# Patient Record
Sex: MALE | Race: BLACK OR AFRICAN AMERICAN | NOT HISPANIC OR LATINO | Employment: FULL TIME | URBAN - METROPOLITAN AREA
[De-identification: names, ages, dates, MRNs, and addresses within clinical notes are randomized per-mention and may not be internally consistent; named-entity substitution may affect disease eponyms.]

---

## 2022-08-08 ENCOUNTER — OFFICE VISIT (OUTPATIENT)
Dept: URGENT CARE | Facility: CLINIC | Age: 27
End: 2022-08-08
Payer: COMMERCIAL

## 2022-08-08 VITALS — TEMPERATURE: 97.8 F | HEART RATE: 72 BPM | OXYGEN SATURATION: 97 % | RESPIRATION RATE: 14 BRPM

## 2022-08-08 DIAGNOSIS — H61.23 BILATERAL HEARING LOSS DUE TO CERUMEN IMPACTION: Primary | ICD-10-CM

## 2022-08-08 PROCEDURE — 99203 OFFICE O/P NEW LOW 30 MIN: CPT | Performed by: PHYSICIAN ASSISTANT

## 2022-08-08 PROCEDURE — 69209 REMOVE IMPACTED EAR WAX UNI: CPT | Performed by: PHYSICIAN ASSISTANT

## 2022-08-08 RX ORDER — OFLOXACIN 3 MG/ML
10 SOLUTION AURICULAR (OTIC) DAILY
Qty: 5 ML | Refills: 0 | Status: SHIPPED | OUTPATIENT
Start: 2022-08-08

## 2022-08-08 RX ORDER — OFLOXACIN 3 MG/ML
10 SOLUTION AURICULAR (OTIC) 2 TIMES DAILY
Qty: 5 ML | Refills: 0 | Status: SHIPPED | OUTPATIENT
Start: 2022-08-08 | End: 2022-08-08

## 2022-08-08 NOTE — PATIENT INSTRUCTIONS
Continue to monitor symptoms  If new or worsening symptoms develop, go immediately to Er  Drink plenty of fluids  Follow up with Family Doctor this week  Earwax Blockage   AMBULATORY CARE:   Earwax  can build up in your ear canal and cause a blockage  Earwax blockage happens when your ear makes earwax faster than your body can remove it  Common symptoms include the following:   Trouble hearing    Earache    Ear fullness or a feeling that something is plugging up your ear    Itching or ringing in your ear    Dizziness    Seed immediate care if:   You feel dizzy  You have discharge or blood coming out of your ear  Your ear pain does not go away or gets worse  Call your doctor if:   You have a fever  You have trouble hearing or hear ringing noises  You have questions or concerns about your condition or care  Treatment for earwax blockage:   Medicines  placed in the ear canal can soften the earwax so it will come out  Flushing your ear canal  with warm water may flush out the earwax  Small medical tools  may be used to remove the earwax  How to prevent earwax blockage:  Do not stick anything into your ears to clean them  Use cotton swabs on the outside of your ear only  Ask your healthcare provider for more information on ways to prevent blockage  Follow up with your doctor as directed:  Write down your questions so you remember to ask them during your visits  © Copyright [employer] 2022 Information is for End User's use only and may not be sold, redistributed or otherwise used for commercial purposes  All illustrations and images included in CareNotes® are the copyrighted property of A [employer] Inc  or [facility] Brett Awan   The above information is an  only  It is not intended as medical advice for individual conditions or treatments  Talk to your doctor, nurse or pharmacist before following any medical regimen to see if it is safe and effective for you

## 2022-08-08 NOTE — PROGRESS NOTES
[address]        NAME: Beny Rodarte is a 32 y o  male  : 1995    MRN: [id number]  DATE: 2022  TIME: 5:41 PM    Assessment and Plan   Bilateral hearing loss due to cerumen impaction [H61 23]  1  Bilateral hearing loss due to cerumen impaction  ofloxacin (FLOXIN) 0 3 % otic solution    Ear cerumen removal    DISCONTINUED: ofloxacin (FLOXIN) 0 3 % otic solution         Patient Instructions       Continue to monitor symptoms  If new or worsening symptoms develop, go immediately to Er  Drink plenty of fluids  Follow up with Family Doctor this week  Chief Complaint     Chief Complaint   Patient presents with    Ear Fullness     Pt presents with ear fullness bilateral, started two months ago; decreased hearing         History of Present Illness       Ear Fullness   There is pain in both ears  This is a recurrent problem  Episode onset: Worsening over the past month  The problem occurs constantly  The problem has been gradually worsening  There has been no fever  The patient is experiencing no pain  Associated symptoms include hearing loss (Muffled)  Pertinent negatives include no abdominal pain, coughing, diarrhea, ear discharge, headaches, neck pain, rash, rhinorrhea, sore throat or vomiting  Treatments tried: Debrox  Review of Systems   Review of Systems   Constitutional: Negative for chills, fatigue and fever  HENT: Positive for hearing loss (Muffled)  Negative for ear discharge, rhinorrhea and sore throat  Respiratory: Negative for cough, chest tightness, shortness of breath and wheezing  Cardiovascular: Negative for chest pain and palpitations  Gastrointestinal: Negative for abdominal pain, diarrhea and vomiting  Musculoskeletal: Negative for back pain, myalgias and neck pain  Skin: Negative for pallor and rash  Neurological: Negative for headaches           Current Medications       Current Outpatient Medications:     ofloxacin (FLOXIN) 0 3 % otic solution, Administer 10 drops into both ears daily, Disp: 5 mL, Rfl: 0    Current Allergies     Allergies as of 08/08/2022    (No Known Allergies)            The following portions of the patient's history were reviewed and updated as appropriate: allergies, current medications, past family history, past medical history, past social history, past surgical history and problem list      History reviewed  No pertinent past medical history  Past Surgical History:   Procedure Laterality Date    EYE SURGERY         History reviewed  No pertinent family history  Medications have been verified  Objective   Pulse 72   Temp 97 8 °F (36 6 °C)   Resp 14   SpO2 97%          Physical Exam     Physical Exam  Vitals and nursing note reviewed  Constitutional:       General: He is not in acute distress  Appearance: He is well-developed  He is not diaphoretic  HENT:      Head: Normocephalic and atraumatic  Right Ear: Tympanic membrane, ear canal and external ear normal  There is impacted cerumen  Left Ear: Tympanic membrane, ear canal and external ear normal  There is impacted cerumen  Ears:      Comments: TMs intact and pearly b/l S/p irrigation  Eyes:      General:         Right eye: No discharge  Left eye: No discharge  Conjunctiva/sclera: Conjunctivae normal    Cardiovascular:      Rate and Rhythm: Normal rate and regular rhythm  Heart sounds: Normal heart sounds  Pulmonary:      Effort: Pulmonary effort is normal  No respiratory distress  Breath sounds: Normal breath sounds  No wheezing or rales  Musculoskeletal:      Cervical back: Normal range of motion and neck supple  Lymphadenopathy:      Cervical: No cervical adenopathy  Skin:     General: Skin is warm  Findings: No rash         Ear cerumen removal    Date/Time: 8/8/2022 5:39 PM  Performed by: Rudolph Casillas PA-C  Authorized by: Rudolph Casillas PA-C   Universal Protocol:  Consent: Verbal consent obtained  Risks and benefits: risks, benefits and alternatives were discussed  Consent given by: patient  Time out: Immediately prior to procedure a "time out" was called to verify the correct patient, procedure, equipment, support staff and site/side marked as required  Timeout called at: 8/8/2022 5:30 PM   Patient understanding: patient states understanding of the procedure being performed  Patient consent: the patient's understanding of the procedure matches consent given  Procedure consent: procedure consent matches procedure scheduled  Relevant documents: relevant documents present and verified  Test results: test results available and properly labeled  Site marked: the operative site was marked  Radiology Images displayed and confirmed  If images not available, report reviewed: imaging studies available  Required items: required blood products, implants, devices, and special equipment available  Patient identity confirmed: verbally with patient      Patient location:  Clinic  Procedure details:     Local anesthetic:  None    Location:  R ear and L ear    Procedure type: irrigation only      Approach:  Natural orifice  Post-procedure details:     Complication:  None    Hearing quality:  Normal    Patient tolerance of procedure:   Tolerated well, no immediate complications

## 2024-01-10 ENCOUNTER — OFFICE VISIT (OUTPATIENT)
Dept: URGENT CARE | Facility: CLINIC | Age: 29
End: 2024-01-10
Payer: COMMERCIAL

## 2024-01-10 VITALS
WEIGHT: 228 LBS | SYSTOLIC BLOOD PRESSURE: 122 MMHG | DIASTOLIC BLOOD PRESSURE: 82 MMHG | TEMPERATURE: 98.6 F | OXYGEN SATURATION: 98 % | RESPIRATION RATE: 16 BRPM | HEART RATE: 106 BPM

## 2024-01-10 DIAGNOSIS — R05.9 COUGH, UNSPECIFIED TYPE: Primary | ICD-10-CM

## 2024-01-10 PROCEDURE — 87636 SARSCOV2 & INF A&B AMP PRB: CPT | Performed by: PHYSICIAN ASSISTANT

## 2024-01-10 PROCEDURE — 99213 OFFICE O/P EST LOW 20 MIN: CPT | Performed by: PHYSICIAN ASSISTANT

## 2024-01-10 RX ORDER — BENZONATATE 100 MG/1
100 CAPSULE ORAL 3 TIMES DAILY PRN
Qty: 20 CAPSULE | Refills: 0 | Status: SHIPPED | OUTPATIENT
Start: 2024-01-10

## 2024-01-10 NOTE — LETTER
January 10, 2024     Patient: Shelton Becerril  YOB: 1995  Date of Visit: 1/10/2024      To Whom it May Concern:    Shelton Becerril is under my professional care. Shelton was seen in my office on 1/10/2024. Shelton may return to work on 1/16/24 .    If you have any questions or concerns, please don't hesitate to call.         Sincerely,          Rebekah Regalado PA-C        CC: No Recipients

## 2024-01-10 NOTE — PROGRESS NOTES
Assessment/Plan    Cough, unspecified type [R05.9]  1. Cough, unspecified type  Covid19 and INFLUENZA A/B PCR    benzonatate (TESSALON PERLES) 100 mg capsule            Subjective:     Patient ID: Shelton Becerril is a 28 y.o. male.      Reason For Visit / Chief Complaint  Chief Complaint   Patient presents with    Cold Like Symptoms     Pt presents with productive cough, PND, body aches, nasal congestion, stuffy nose; started MOnday         D.R. is a 28-year-old male with no significant PMH, presenting to the urgent care today for flu-like symptoms x 3 days. Patient reports that he has been experiencing fatigue, low-grade fever, intermittent headache, ear fullness, post-nasal drip, congestion, cough, sore throat, and diarrhea since symptoms began. He states that he has been using Mucinex and Halls cough drops over-the-counter, which have provided some relief of symptoms. He admits to coughing up some clear and green sputum yesterday. Patient reports that his daughter has been sick recently with a cough, but denies any other sick contacts. Denies any chills, ear pain, chest pain, shortness of breath, dyspnea, nausea, and vomiting.      History reviewed. No pertinent past medical history.    Past Surgical History:   Procedure Laterality Date    EYE SURGERY         History reviewed. No pertinent family history.    Review of Systems   Constitutional:  Positive for fatigue and fever (Patient had a low-grade fever on Monday.). Negative for chills.   HENT:  Positive for congestion, postnasal drip, rhinorrhea and sore throat. Negative for ear pain (Patient admits to ear fullness and popping.).    Eyes:  Negative for visual disturbance.   Respiratory:  Positive for cough (Cough has been producing clear and green sputum.). Negative for chest tightness and shortness of breath.    Cardiovascular:  Negative for chest pain.   Gastrointestinal:  Positive for diarrhea. Negative for abdominal pain, nausea and vomiting.    Musculoskeletal:  Positive for myalgias (Patient admits to generalized body aches.).   Neurological:  Positive for headaches.       Objective:    /82   Pulse (!) 106   Temp 98.6 °F (37 °C)   Resp 16   Wt 103 kg (228 lb)   SpO2 98%     Physical Exam  Constitutional:       General: He is awake. He is not in acute distress.     Appearance: Normal appearance. He is well-developed and well-groomed. He is not ill-appearing, toxic-appearing or diaphoretic.   HENT:      Head: Normocephalic and atraumatic.      Right Ear: There is impacted cerumen.      Left Ear: There is impacted cerumen.      Ears:      Comments: TMs unable to be visualized b/l due to cerumen impaction.     Nose: Nose normal. No congestion or rhinorrhea.      Mouth/Throat:      Mouth: Mucous membranes are moist.      Pharynx: Oropharynx is clear. No oropharyngeal exudate or posterior oropharyngeal erythema.   Eyes:      Extraocular Movements: Extraocular movements intact.      Conjunctiva/sclera: Conjunctivae normal.      Pupils: Pupils are equal, round, and reactive to light.   Cardiovascular:      Rate and Rhythm: Normal rate and regular rhythm. No extrasystoles are present.     Heart sounds: Normal heart sounds, S1 normal and S2 normal. Heart sounds not distant. No murmur heard.     No friction rub. No gallop.   Pulmonary:      Effort: Pulmonary effort is normal. No accessory muscle usage or respiratory distress.      Breath sounds: Normal breath sounds. No stridor. No decreased breath sounds, wheezing, rhonchi or rales.   Chest:      Chest wall: No tenderness.   Musculoskeletal:         General: Normal range of motion.   Skin:     General: Skin is warm.      Capillary Refill: Capillary refill takes less than 2 seconds.   Neurological:      General: No focal deficit present.      Mental Status: He is alert and oriented to person, place, and time.   Psychiatric:         Behavior: Behavior is cooperative.

## 2024-01-11 LAB
FLUAV RNA RESP QL NAA+PROBE: NEGATIVE
FLUBV RNA RESP QL NAA+PROBE: NEGATIVE
SARS-COV-2 RNA RESP QL NAA+PROBE: POSITIVE

## 2024-01-11 NOTE — PATIENT INSTRUCTIONS
Viral Syndrome   WHAT YOU NEED TO KNOW:   Viral syndrome is a term used for symptoms of an infection caused by a virus. Viruses are spread easily from person to person on shared items.  DISCHARGE INSTRUCTIONS:   Call your local emergency number (911 in the [place]), or have someone call if:   You have a seizure.    You cannot be woken.    You have chest pain or trouble breathing.    Return to the emergency department if:   You have a stiff neck, a bad headache, and sensitivity to light.    You feel weak, dizzy, or confused.    You stop urinating or urinate a lot less than usual.    You cough up blood or thick yellow or green mucus.    You have severe abdominal pain or your abdomen is larger than usual.    Call your doctor if:   Your symptoms do not get better with treatment or get worse after 3 days.    You have a rash or ear pain.    You have burning when you urinate.    You have questions or concerns about your condition or care.    Medicines:  You may  need any of the following:  Acetaminophen  decreases pain and fever. It is available without a doctor's order. Ask how much to take and how often to take it. Follow directions. Read the labels of all other medicines you are using to see if they also contain acetaminophen, or ask your doctor or pharmacist. Acetaminophen can cause liver damage if not taken correctly.    NSAIDs , such as ibuprofen, help decrease swelling, pain, and fever. NSAIDs can cause stomach bleeding or kidney problems in certain people. If you take blood thinner medicine, always ask your healthcare provider if NSAIDs are safe for you. Always read the medicine label and follow directions.    Cold medicine  helps decrease swelling, control a cough, and relieve chest or nasal congestion.     Saline nasal spray  helps decrease nasal congestion.     Take your medicine as directed.  Contact your healthcare provider if you think your medicine is not helping or if you have side effects. Tell your provider if  you are allergic to any medicine. Keep a list of the medicines, vitamins, and herbs you take. Include the amounts, and when and why you take them. Bring the list or the pill bottles to follow-up visits. Carry your medicine list with you in case of an emergency.    Manage your symptoms:   Drink liquids as directed to prevent dehydration.  Ask how much liquid to drink each day and which liquids are best for you. Do not drink liquids with caffeine. Caffeine can make dehydration worse.     Get plenty of rest to help your body heal.  Take naps throughout the day. Ask your healthcare provider when you can return to work and your normal activities.    Use a cool mist humidifier  to increase air moisture in your home. This may make it easier for you to breathe and help decrease your cough.     Drink tea with honey or use cough drops for a sore throat.  Cough drops are available without a doctor's order. Follow directions for taking cough drops.    Do not smoke or be close to anyone who is smoking.  Nicotine and other chemicals in cigarettes and cigars can cause lung damage. Smoking can also delay healing. Ask your healthcare provider for information if you currently smoke and need help to quit. E-cigarettes or smokeless tobacco still contain nicotine. Talk to your healthcare provider before you use these products.    Prevent the spread of germs:   Wash your hands often throughout the day.  Use soap and water. Rub your soapy hands together, lacing your fingers, for at least 20 seconds. Rinse with warm, running water. Dry your hands with a clean towel or paper towel. Use hand  that contains alcohol if soap and water are not available. Teach children how to wash their hands and use hand .         Cover sneezes and coughs.  Turn your face away and cover your mouth and nose with a tissue. Throw the tissue away. Use the bend of your arm if a tissue is not available. Then wash your hands well with soap and water  or use hand . Teach children how to cover a cough or sneeze.    Stay home while you are sick.  Avoid crowds as much as possible.    Get the influenza (flu) vaccine as soon as recommended each year.  The flu vaccine is available starting in September or October. Ask your healthcare provider about the pneumonia vaccine. This vaccine is usually recommended every 5 years in older adults.       Follow up with your doctor as directed:  Write down your questions so you remember to ask them during your visits.  © Copyright [employer] 2023 Information is for End User's use only and may not be sold, redistributed or otherwise used for commercial purposes.  The above information is an  only. It is not intended as medical advice for individual conditions or treatments. Talk to your doctor, nurse or pharmacist before following any medical regimen to see if it is safe and effective for you.

## 2024-01-15 ENCOUNTER — TELEPHONE (OUTPATIENT)
Dept: URGENT CARE | Facility: CLINIC | Age: 29
End: 2024-01-15

## 2024-09-10 ENCOUNTER — TELEPHONE (OUTPATIENT)
Age: 29
End: 2024-09-10

## 2024-10-18 ENCOUNTER — OFFICE VISIT (OUTPATIENT)
Dept: FAMILY MEDICINE CLINIC | Facility: CLINIC | Age: 29
End: 2024-10-18
Payer: COMMERCIAL

## 2024-10-18 VITALS
HEART RATE: 75 BPM | SYSTOLIC BLOOD PRESSURE: 110 MMHG | RESPIRATION RATE: 16 BRPM | DIASTOLIC BLOOD PRESSURE: 70 MMHG | HEIGHT: 72 IN | WEIGHT: 231 LBS | TEMPERATURE: 96.6 F | BODY MASS INDEX: 31.29 KG/M2

## 2024-10-18 DIAGNOSIS — Z00.00 HEALTHCARE MAINTENANCE: Primary | ICD-10-CM

## 2024-10-18 DIAGNOSIS — E66.9 OBESITY (BMI 30-39.9): ICD-10-CM

## 2024-10-18 DIAGNOSIS — Z13.220 SCREENING FOR LIPID DISORDERS: ICD-10-CM

## 2024-10-18 DIAGNOSIS — Z13.1 SCREENING FOR DIABETES MELLITUS (DM): ICD-10-CM

## 2024-10-18 PROCEDURE — 99395 PREV VISIT EST AGE 18-39: CPT | Performed by: FAMILY MEDICINE

## 2024-10-18 NOTE — PROGRESS NOTES
Assessment/Plan:    No problem-specific Assessment & Plan notes found for this encounter.    Cpe  MJ warning  Labs  Diet/exercise     Diagnoses and all orders for this visit:    Healthcare maintenance    Screening for diabetes mellitus (DM)  -     Comprehensive metabolic panel; Future  -     Comprehensive metabolic panel    Screening for lipid disorders  -     Lipid Panel with Direct LDL reflex; Future  -     Lipid Panel with Direct LDL reflex    BMI 31.0-31.9,adult    Obesity (BMI 30-39.9)        No follow-ups on file.    Subjective:      Patient ID: Shelton Becerril is a 29 y.o. male.    Chief Complaint   Patient presents with    [facility]     Cma        HPI  No daily meds  No prior med hx    Right eye orbit surgery as child  No diplopia    Sales and realtor    No current exercise program  Diet regular  Some grains  Some red meat  Low fat  High carb  Largest meal dinner    Drinks water    No prior abnormal labs    No etoh  MJ 3x/w, smokes (risks aware)  No tobacco    2 kids, 6y and 11m  Not ready for vasectomy    The following portions of the patient's history were reviewed and updated as appropriate: allergies, current medications, past family history, past medical history, past social history, past surgical history and problem list.    Review of Systems   Respiratory:  Negative for shortness of breath.    Cardiovascular:  Negative for chest pain.         No current outpatient medications on file.     No current facility-administered medications for this visit.       Objective:    /70   Pulse 75   Temp (!) 96.6 °F (35.9 °C) (Tympanic)   Resp 16   Ht 6' (1.829 m)   Wt 105 kg (231 lb)   BMI 31.33 kg/m²        Physical Exam  Vitals and nursing note reviewed.   Constitutional:       General: He is not in acute distress.     Appearance: He is well-developed. He is obese. He is not ill-appearing.   HENT:      Head: Normocephalic.      Right Ear: Tympanic membrane normal.      Left Ear: Tympanic membrane  normal.      Mouth/Throat:      Mouth: Mucous membranes are moist.   Eyes:      General: No scleral icterus.     Conjunctiva/sclera: Conjunctivae normal.   Neck:      Vascular: No carotid bruit.   Cardiovascular:      Rate and Rhythm: Normal rate and regular rhythm.      Heart sounds: No murmur heard.  Pulmonary:      Effort: Pulmonary effort is normal. No respiratory distress.      Breath sounds: No wheezing.   Abdominal:      General: There is no distension.      Palpations: Abdomen is soft.      Tenderness: There is no abdominal tenderness.      Hernia: No hernia is present.   Genitourinary:     Penis: Normal.       Testes: Normal.   Musculoskeletal:         General: No deformity.      Cervical back: Neck supple.      Right lower leg: No edema.      Left lower leg: No edema.   Skin:     General: Skin is warm and dry.      Coloration: Skin is not jaundiced or pale.   Neurological:      Mental Status: He is alert.      Motor: No weakness.      Gait: Gait normal.   Psychiatric:         Mood and Affect: Mood normal.         Behavior: Behavior normal.         Thought Content: Thought content normal.                Jesus Blunt DO

## 2024-10-19 PROBLEM — Z00.00 HEALTHCARE MAINTENANCE: Status: ACTIVE | Noted: 2024-10-19

## 2024-10-19 PROBLEM — Z72.0 TOBACCO USE: Status: ACTIVE | Noted: 2024-10-19

## 2024-10-19 PROBLEM — E66.9 OBESITY (BMI 30-39.9): Status: ACTIVE | Noted: 2024-10-19
